# Patient Record
Sex: MALE | Race: OTHER | HISPANIC OR LATINO | Employment: UNEMPLOYED | URBAN - METROPOLITAN AREA
[De-identification: names, ages, dates, MRNs, and addresses within clinical notes are randomized per-mention and may not be internally consistent; named-entity substitution may affect disease eponyms.]

---

## 2023-10-20 ENCOUNTER — OFFICE VISIT (OUTPATIENT)
Dept: URGENT CARE | Facility: PHYSICIAN GROUP | Age: 33
End: 2023-10-20

## 2023-10-20 VITALS
SYSTOLIC BLOOD PRESSURE: 112 MMHG | RESPIRATION RATE: 16 BRPM | WEIGHT: 251.99 LBS | TEMPERATURE: 97.7 F | HEART RATE: 60 BPM | DIASTOLIC BLOOD PRESSURE: 76 MMHG | OXYGEN SATURATION: 99 % | BODY MASS INDEX: 33.4 KG/M2 | HEIGHT: 73 IN

## 2023-10-20 DIAGNOSIS — J34.89 INTERNAL NASAL LESION: ICD-10-CM

## 2023-10-20 DIAGNOSIS — L02.91 ABSCESS: ICD-10-CM

## 2023-10-20 PROCEDURE — 3078F DIAST BP <80 MM HG: CPT

## 2023-10-20 PROCEDURE — 3074F SYST BP LT 130 MM HG: CPT

## 2023-10-20 PROCEDURE — 99213 OFFICE O/P EST LOW 20 MIN: CPT

## 2023-10-20 RX ORDER — SULFAMETHOXAZOLE AND TRIMETHOPRIM 800; 160 MG/1; MG/1
1 TABLET ORAL 2 TIMES DAILY
Qty: 14 TABLET | Refills: 0 | Status: SHIPPED | OUTPATIENT
Start: 2023-10-20 | End: 2023-10-27

## 2023-10-20 RX ORDER — IBUPROFEN 200 MG
200 TABLET ORAL EVERY 6 HOURS PRN
COMMUNITY

## 2023-10-20 NOTE — PROGRESS NOTES
"Subjective:   Yobany Ly is a 33 y.o. male who presents for Cyst (Left inside nose x 4 days )      HPI:    Patient presents to urgent care with concerns of cyst in his left nostril.  States lesion has been present for 4 days.  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  He states he tried to lopez it at home and was able to drain white thick material from it.  He reports pain and  pressure of the left nare.  Denies fever, chills  States symptoms started with discomfort and then he develops a pimple, which he typically pops at home.        ROS As above in HPI    Medications:    Current Outpatient Medications on File Prior to Visit   Medication Sig Dispense Refill    ibuprofen (MOTRIN) 200 MG Tab Take 200 mg by mouth every 6 hours as needed.       No current facility-administered medications on file prior to visit.        Allergies:   Penicillins    Problem List:   There is no problem list on file for this patient.       Surgical History:  No past surgical history on file.    Past Social Hx:           Problem list, medications, and allergies reviewed by myself today in Epic.     Objective:     /76   Pulse 60   Temp 36.5 °C (97.7 °F) (Temporal)   Resp 16   Ht 1.854 m (6' 1\")   Wt 114 kg (251 lb 15.8 oz)   SpO2 99%   BMI 33.25 kg/m²     Physical Exam  Vitals and nursing note reviewed.   Constitutional:       Appearance: Normal appearance.   HENT:      Head: Normocephalic.      Jaw: There is normal jaw occlusion.      Right Ear: Tympanic membrane and ear canal normal.      Left Ear: Tympanic membrane and ear canal normal.      Nose: Nasal tenderness present.      Left Nostril: No epistaxis, septal hematoma or occlusion.      Right Sinus: No maxillary sinus tenderness or frontal sinus tenderness.      Left Sinus: No maxillary sinus tenderness or frontal sinus tenderness.      Comments: Left lateral aspect of nare has moderate sized lesion. There is TTP, " fluctuance. Dried eschar and honey colored crust is present. Bridge of nose and proximal facial structures are not tender to palpation. No lymphadenopathy appreciated.  Neurological:      Mental Status: He is alert.         Assessment/Plan:     Diagnosis and associated orders:   1. Internal nasal lesion  - mupirocin (BACTROBAN) 2 % Ointment; Apply 1 Application topically 2 times a day.  Dispense: 22 g; Refill: 0    2. Abscess  - sulfamethoxazole-trimethoprim (BACTRIM DS) 800-160 MG tablet; Take 1 Tablet by mouth 2 times a day for 7 days.  Dispense: 14 Tablet; Refill: 0  - mupirocin (BACTROBAN) 2 % Ointment; Apply 1 Application topically 2 times a day.  Dispense: 22 g; Refill: 0    Other orders  - ibuprofen (MOTRIN) 200 MG Tab; Take 200 mg by mouth every 6 hours as needed.        Comments/MDM:     Left lateral nare has lesion with dried drainage. Patient lanced lesion at home.   Will cover for cellulitis with bactrim due to allergies to penicillin.   Advised patient refrain from attempts to lopez or pick at lesions in the future.   Recommend nasal rinses, application of warm packs, NSAIDs/Tylenol per package instructions. Will also have patient apply bactroban to the left nare.  Return to UC if symptoms fail to improve.   Strict return to ER precautions reviewed  Patient referred to [facility] and [facility]    Translation services used during this patient encounter       Please note that this dictation was created using voice recognition software. I have made a reasonable attempt to correct obvious errors, but I expect that there are errors of grammar and possibly content that I did not discover before finalizing the note.    This note was electronically signed by CHARITO Estrada